# Patient Record
Sex: FEMALE | Race: WHITE | NOT HISPANIC OR LATINO | URBAN - METROPOLITAN AREA
[De-identification: names, ages, dates, MRNs, and addresses within clinical notes are randomized per-mention and may not be internally consistent; named-entity substitution may affect disease eponyms.]

---

## 2017-05-23 ENCOUNTER — APPOINTMENT (OUTPATIENT)
Age: 47
Setting detail: DERMATOLOGY
End: 2017-05-28

## 2017-05-23 DIAGNOSIS — L23.9 ALLERGIC CONTACT DERMATITIS, UNSPECIFIED CAUSE: ICD-10-CM

## 2017-05-23 DIAGNOSIS — D22 MELANOCYTIC NEVI: ICD-10-CM

## 2017-05-23 DIAGNOSIS — L82.1 OTHER SEBORRHEIC KERATOSIS: ICD-10-CM

## 2017-05-23 DIAGNOSIS — L90.5 SCAR CONDITIONS AND FIBROSIS OF SKIN: ICD-10-CM

## 2017-05-23 DIAGNOSIS — L73.8 OTHER SPECIFIED FOLLICULAR DISORDERS: ICD-10-CM

## 2017-05-23 PROBLEM — D22.62 MELANOCYTIC NEVI OF LEFT UPPER LIMB, INCLUDING SHOULDER: Status: ACTIVE | Noted: 2017-05-23

## 2017-05-23 PROBLEM — D22.5 MELANOCYTIC NEVI OF TRUNK: Status: ACTIVE | Noted: 2017-05-23

## 2017-05-23 PROBLEM — D22.61 MELANOCYTIC NEVI OF RIGHT UPPER LIMB, INCLUDING SHOULDER: Status: ACTIVE | Noted: 2017-05-23

## 2017-05-23 PROCEDURE — 99214 OFFICE O/P EST MOD 30 MIN: CPT

## 2017-05-23 PROCEDURE — OTHER COUNSELING: OTHER

## 2017-05-23 PROCEDURE — OTHER OBSERVATION: OTHER

## 2017-05-23 PROCEDURE — OTHER OBSERVATION AND MEASURE: OTHER

## 2017-05-23 ASSESSMENT — LOCATION ZONE DERM
LOCATION ZONE: LEG
LOCATION ZONE: ARM
LOCATION ZONE: FINGER
LOCATION ZONE: TRUNK
LOCATION ZONE: HAND

## 2017-05-23 ASSESSMENT — LOCATION DETAILED DESCRIPTION DERM
LOCATION DETAILED: RIGHT MEDIAL DISTAL PRETIBIAL REGION
LOCATION DETAILED: LEFT VENTRAL PROXIMAL FOREARM
LOCATION DETAILED: LEFT ULNAR PALM
LOCATION DETAILED: RIGHT MEDIAL BREAST 1-2:00 REGION
LOCATION DETAILED: RIGHT ANTECUBITAL SKIN
LOCATION DETAILED: LEFT DISTAL PRETIBIAL REGION
LOCATION DETAILED: LEFT PROXIMAL DORSAL RING FINGER
LOCATION DETAILED: RIGHT SUPERIOR MEDIAL MIDBACK
LOCATION DETAILED: PERIUMBILICAL SKIN

## 2017-05-23 ASSESSMENT — LOCATION SIMPLE DESCRIPTION DERM
LOCATION SIMPLE: RIGHT BREAST
LOCATION SIMPLE: LEFT PRETIBIAL REGION
LOCATION SIMPLE: RIGHT LOWER BACK
LOCATION SIMPLE: LEFT FOREARM
LOCATION SIMPLE: RIGHT PRETIBIAL REGION
LOCATION SIMPLE: LEFT RING FINGER
LOCATION SIMPLE: LEFT HAND
LOCATION SIMPLE: ABDOMEN
LOCATION SIMPLE: RIGHT UPPER ARM

## 2017-05-24 ENCOUNTER — RX ONLY (RX ONLY)
Age: 47
End: 2017-05-24

## 2017-05-24 RX ORDER — HYDROCORTISONE 2.5 %
OINTMENT (GRAM) TOPICAL
Qty: 1 | Refills: 0 | Status: ERX | COMMUNITY
Start: 2017-05-24

## 2017-08-23 ENCOUNTER — APPOINTMENT (OUTPATIENT)
Age: 47
Setting detail: DERMATOLOGY
End: 2017-08-26

## 2017-08-23 DIAGNOSIS — D22 MELANOCYTIC NEVI: ICD-10-CM

## 2017-08-23 DIAGNOSIS — D485 NEOPLASM OF UNCERTAIN BEHAVIOR OF SKIN: ICD-10-CM

## 2017-08-23 DIAGNOSIS — L81.0 POSTINFLAMMATORY HYPERPIGMENTATION: ICD-10-CM

## 2017-08-23 DIAGNOSIS — L23.9 ALLERGIC CONTACT DERMATITIS, UNSPECIFIED CAUSE: ICD-10-CM

## 2017-08-23 PROBLEM — D22.62 MELANOCYTIC NEVI OF LEFT UPPER LIMB, INCLUDING SHOULDER: Status: ACTIVE | Noted: 2017-08-23

## 2017-08-23 PROBLEM — D48.5 NEOPLASM OF UNCERTAIN BEHAVIOR OF SKIN: Status: ACTIVE | Noted: 2017-08-23

## 2017-08-23 PROBLEM — D22.71 MELANOCYTIC NEVI OF RIGHT LOWER LIMB, INCLUDING HIP: Status: ACTIVE | Noted: 2017-08-23

## 2017-08-23 PROCEDURE — OTHER OBSERVATION: OTHER

## 2017-08-23 PROCEDURE — OTHER COUNSELING: OTHER

## 2017-08-23 PROCEDURE — 11100: CPT

## 2017-08-23 PROCEDURE — 99213 OFFICE O/P EST LOW 20 MIN: CPT | Mod: 25

## 2017-08-23 PROCEDURE — OTHER OBSERVATION AND MEASURE: OTHER

## 2017-08-23 PROCEDURE — OTHER BIOPSY BY SHAVE METHOD: OTHER

## 2017-08-23 ASSESSMENT — LOCATION ZONE DERM
LOCATION ZONE: LEG
LOCATION ZONE: TRUNK
LOCATION ZONE: HAND

## 2017-08-23 ASSESSMENT — LOCATION SIMPLE DESCRIPTION DERM
LOCATION SIMPLE: LEFT HAND
LOCATION SIMPLE: GROIN
LOCATION SIMPLE: RIGHT POPLITEAL SKIN
LOCATION SIMPLE: LEFT LOWER BACK

## 2017-08-23 ASSESSMENT — LOCATION DETAILED DESCRIPTION DERM
LOCATION DETAILED: RIGHT SUPRAPUBIC SKIN
LOCATION DETAILED: RIGHT POPLITEAL SKIN
LOCATION DETAILED: LEFT SUPERIOR MEDIAL LOWER BACK
LOCATION DETAILED: LEFT ULNAR PALM

## 2017-08-23 NOTE — PROCEDURE: BIOPSY BY SHAVE METHOD
Biopsy Method: Double edge Personna blades
Additional Anesthesia Volume In Cc (Will Not Render If 0): 0
Electrodesiccation Text: The wound bed was treated with electrodesiccation after the biopsy was performed.
Billing Type: Third-Party Bill
Bill 22572 For Specimen Handling/Conveyance To Laboratory?: no
Consent: Written consent was obtained and risks were reviewed including but not limited to scarring, infection, bleeding, scabbing, incomplete removal, nerve damage and allergy to anesthesia.
Anesthesia Volume In Cc (Will Not Render If 0): 0.5
Hemostasis: Drysol
Wound Care: Vaseline
Type Of Destruction Used: Curettage
Render Post-Care Instructions In Note?: yes
Notification Instructions: Patient will be notified of biopsy results. However, patient instructed to call the office if not contacted within 2 weeks.
Biopsy Type: H and E
Detail Level: Detailed
Silver Nitrate Text: The wound bed was treated with silver nitrate after the biopsy was performed.
Electrodesiccation And Curettage Text: The wound bed was treated with electrodesiccation and curettage after the biopsy was performed.
Anesthesia Type: 2% lidocaine with epinephrine
Post-Care Instructions: I reviewed with the patient in detail post-care instructions. Patient is to keep the biopsy site dry overnight, and then apply bacitracin twice daily until healed. Patient may apply hydrogen peroxide soaks to remove any crusting.
Cryotherapy Text: The wound bed was treated with cryotherapy after the biopsy was performed.
Dressing: bandage

## 2017-09-28 ENCOUNTER — APPOINTMENT (OUTPATIENT)
Age: 47
Setting detail: DERMATOLOGY
End: 2017-10-04

## 2017-09-28 ENCOUNTER — APPOINTMENT (OUTPATIENT)
Age: 47
Setting detail: DERMATOLOGY
End: 2017-09-28

## 2017-09-28 DIAGNOSIS — Z48.817 ENCOUNTER FOR SURGICAL AFTERCARE FOLLOWING SURGERY ON THE SKIN AND SUBCUTANEOUS TISSUE: ICD-10-CM

## 2017-09-28 DIAGNOSIS — Z41.9 ENCOUNTER FOR PROCEDURE FOR PURPOSES OTHER THAN REMEDYING HEALTH STATE, UNSPECIFIED: ICD-10-CM

## 2017-09-28 PROCEDURE — 99212 OFFICE O/P EST SF 10 MIN: CPT

## 2017-09-28 PROCEDURE — OTHER COUNSELING: OTHER

## 2017-09-28 PROCEDURE — OTHER REASSURANCE: OTHER

## 2017-09-28 PROCEDURE — OTHER MEDICAL CONSULTATION: DERMAPEN: OTHER

## 2017-09-28 PROCEDURE — OTHER POST-OP WOUND EVALUATION: OTHER

## 2017-09-28 PROCEDURE — OTHER MEDICAL CONSULTATION: PRODUCTS: OTHER

## 2017-09-28 PROCEDURE — OTHER OTHER (COSMETIC): OTHER

## 2017-09-28 ASSESSMENT — LOCATION SIMPLE DESCRIPTION DERM
LOCATION SIMPLE: LEFT CHEEK
LOCATION SIMPLE: LEFT LOWER BACK
LOCATION SIMPLE: RIGHT CHEEK

## 2017-09-28 ASSESSMENT — LOCATION ZONE DERM
LOCATION ZONE: TRUNK
LOCATION ZONE: FACE

## 2017-09-28 ASSESSMENT — LOCATION DETAILED DESCRIPTION DERM
LOCATION DETAILED: RIGHT INFERIOR LATERAL MALAR CHEEK
LOCATION DETAILED: RIGHT LATERAL MALAR CHEEK
LOCATION DETAILED: LEFT SUPERIOR MEDIAL MIDBACK
LOCATION DETAILED: LEFT INFERIOR CENTRAL MALAR CHEEK
LOCATION DETAILED: LEFT SUPERIOR LATERAL MIDBACK

## 2017-09-28 NOTE — PROCEDURE: POST-OP WOUND EVALUATION
Add 49628 Cpt? (Important Note: In 2017 The Use Of 86901 Is Being Tracked By Cms To Determine Future Global Period Reimbursement For Global Periods): no
Follow Up Units (Optional): 0
Wound Evaluated By (Optional): Tammy PLUMMERC
Detail Level: Simple

## 2017-09-28 NOTE — PROCEDURE: OTHER (COSMETIC)
Detail Level: Zone
Other (Free Text): rec micro needling series for the face. also discussed the importance of good at home skin care, pt states she is \"anti-chemicals\" --- pt purchased tretinion .05 and ZO vit c today. rec 3 to 4 nights per week of 1/2 gram tret to face. LM

## 2018-10-30 ENCOUNTER — APPOINTMENT (OUTPATIENT)
Age: 48
Setting detail: DERMATOLOGY
End: 2018-10-31

## 2018-10-30 DIAGNOSIS — D485 NEOPLASM OF UNCERTAIN BEHAVIOR OF SKIN: ICD-10-CM

## 2018-10-30 DIAGNOSIS — L57.8 OTHER SKIN CHANGES DUE TO CHRONIC EXPOSURE TO NONIONIZING RADIATION: ICD-10-CM

## 2018-10-30 DIAGNOSIS — L81.1 CHLOASMA: ICD-10-CM

## 2018-10-30 DIAGNOSIS — Z71.89 OTHER SPECIFIED COUNSELING: ICD-10-CM

## 2018-10-30 DIAGNOSIS — D22 MELANOCYTIC NEVI: ICD-10-CM

## 2018-10-30 PROBLEM — D23.39 OTHER BENIGN NEOPLASM OF SKIN OF OTHER PARTS OF FACE: Status: ACTIVE | Noted: 2018-10-30

## 2018-10-30 PROBLEM — D22.62 MELANOCYTIC NEVI OF LEFT UPPER LIMB, INCLUDING SHOULDER: Status: ACTIVE | Noted: 2018-10-30

## 2018-10-30 PROBLEM — D48.5 NEOPLASM OF UNCERTAIN BEHAVIOR OF SKIN: Status: ACTIVE | Noted: 2018-10-30

## 2018-10-30 PROBLEM — D22.5 MELANOCYTIC NEVI OF TRUNK: Status: ACTIVE | Noted: 2018-10-30

## 2018-10-30 PROBLEM — D22.61 MELANOCYTIC NEVI OF RIGHT UPPER LIMB, INCLUDING SHOULDER: Status: ACTIVE | Noted: 2018-10-30

## 2018-10-30 PROCEDURE — OTHER BIOPSY BY SHAVE METHOD: OTHER

## 2018-10-30 PROCEDURE — OTHER COUNSELING: OTHER

## 2018-10-30 PROCEDURE — OTHER MIPS QUALITY: OTHER

## 2018-10-30 PROCEDURE — OTHER TREATMENT REGIMEN: OTHER

## 2018-10-30 PROCEDURE — 11100: CPT

## 2018-10-30 PROCEDURE — 11101: CPT

## 2018-10-30 PROCEDURE — 99214 OFFICE O/P EST MOD 30 MIN: CPT | Mod: 25

## 2018-10-30 ASSESSMENT — LOCATION ZONE DERM
LOCATION ZONE: NECK
LOCATION ZONE: LEG
LOCATION ZONE: TRUNK
LOCATION ZONE: LIP
LOCATION ZONE: ARM

## 2018-10-30 ASSESSMENT — LOCATION SIMPLE DESCRIPTION DERM
LOCATION SIMPLE: ABDOMEN
LOCATION SIMPLE: RIGHT UPPER BACK
LOCATION SIMPLE: LEFT POSTERIOR UPPER ARM
LOCATION SIMPLE: LEFT PRETIBIAL REGION
LOCATION SIMPLE: RIGHT FOREARM
LOCATION SIMPLE: CHEST
LOCATION SIMPLE: LEFT FOREARM
LOCATION SIMPLE: POSTERIOR NECK
LOCATION SIMPLE: RIGHT POSTERIOR THIGH
LOCATION SIMPLE: RIGHT POSTERIOR UPPER ARM
LOCATION SIMPLE: UPPER LIP

## 2018-10-30 ASSESSMENT — LOCATION DETAILED DESCRIPTION DERM
LOCATION DETAILED: LEFT DISTAL PRETIBIAL REGION
LOCATION DETAILED: RIGHT DISTAL POSTERIOR THIGH
LOCATION DETAILED: RIGHT MEDIAL UPPER BACK
LOCATION DETAILED: RIGHT DISTAL RADIAL DORSAL FOREARM
LOCATION DETAILED: PHILTRUM
LOCATION DETAILED: LEFT PROXIMAL POSTERIOR UPPER ARM
LOCATION DETAILED: UPPER STERNUM
LOCATION DETAILED: RIGHT MEDIAL TRAPEZIAL NECK
LOCATION DETAILED: LEFT RIB CAGE
LOCATION DETAILED: RIGHT DISTAL POSTERIOR UPPER ARM
LOCATION DETAILED: LEFT PROXIMAL DORSAL FOREARM

## 2018-10-30 NOTE — PROCEDURE: BIOPSY BY SHAVE METHOD
Electrodesiccation Text: The wound bed was treated with electrodesiccation after the biopsy was performed.
Anesthesia Type: 1% Xylocaine with [id number] epinephrine and sodium bicarbonate
Wound Care: Vaseline
Post-Care Instructions: I reviewed with the patient in detail post-care instructions. Patient is to keep the biopsy site dry overnight, and then apply bacitracin twice daily until healed. Patient may apply hydrogen peroxide soaks to remove any crusting.
Size Of Lesion In Cm: 0
Detail Level: Detailed
Anesthesia Volume In Cc (Will Not Render If 0): 0.5
Type Of Destruction Used: Curettage
Bill For Surgical Tray: no
Electrodesiccation And Curettage Text: The wound bed was treated with electrodesiccation and curettage after the biopsy was performed.
Dressing: bandage
Biopsy Type: H and E
Billing Type: Third-Party Bill
Hemostasis: Drysol
Consent: Written consent was obtained and risks were reviewed including but not limited to scarring, infection, bleeding, scabbing, incomplete removal, nerve damage and allergy to anesthesia.
Biopsy Method: Personna blade
Cryotherapy Text: The wound bed was treated with cryotherapy after the biopsy was performed.
Render Post-Care Instructions In Note?: yes
Notification Instructions: Patient will be notified of biopsy results. However, patient instructed to call the office if not contacted within 2 weeks.
Silver Nitrate Text: The wound bed was treated with silver nitrate after the biopsy was performed.
Depth Of Biopsy: dermis

## 2018-10-30 NOTE — PROCEDURE: MIPS QUALITY
Detail Level: Zone
Quality 110: Preventive Care And Screening: Influenza Immunization: Influenza Immunization Administered during Influenza season
Quality 226: Preventive Care And Screening: Tobacco Use: Screening And Cessation Intervention: Patient screened for tobacco and never smoked

## 2019-10-08 ENCOUNTER — APPOINTMENT (OUTPATIENT)
Age: 49
Setting detail: DERMATOLOGY
End: 2019-10-17

## 2019-10-08 DIAGNOSIS — D22 MELANOCYTIC NEVI: ICD-10-CM

## 2019-10-08 DIAGNOSIS — D18.0 HEMANGIOMA: ICD-10-CM

## 2019-10-08 DIAGNOSIS — Z71.89 OTHER SPECIFIED COUNSELING: ICD-10-CM

## 2019-10-08 DIAGNOSIS — L82.1 OTHER SEBORRHEIC KERATOSIS: ICD-10-CM

## 2019-10-08 DIAGNOSIS — S90.1 CONTUSION OF TOE WITHOUT DAMAGE TO NAIL: ICD-10-CM

## 2019-10-08 DIAGNOSIS — L81.1 CHLOASMA: ICD-10-CM

## 2019-10-08 DIAGNOSIS — L57.8 OTHER SKIN CHANGES DUE TO CHRONIC EXPOSURE TO NONIONIZING RADIATION: ICD-10-CM

## 2019-10-08 DIAGNOSIS — L81.4 OTHER MELANIN HYPERPIGMENTATION: ICD-10-CM

## 2019-10-08 PROBLEM — D23.39 OTHER BENIGN NEOPLASM OF SKIN OF OTHER PARTS OF FACE: Status: ACTIVE | Noted: 2019-10-08

## 2019-10-08 PROBLEM — D22.62 MELANOCYTIC NEVI OF LEFT UPPER LIMB, INCLUDING SHOULDER: Status: ACTIVE | Noted: 2019-10-08

## 2019-10-08 PROBLEM — D22.72 MELANOCYTIC NEVI OF LEFT LOWER LIMB, INCLUDING HIP: Status: ACTIVE | Noted: 2019-10-08

## 2019-10-08 PROBLEM — D22.5 MELANOCYTIC NEVI OF TRUNK: Status: ACTIVE | Noted: 2019-10-08

## 2019-10-08 PROBLEM — S90.112A CONTUSION OF LEFT GREAT TOE WITHOUT DAMAGE TO NAIL, INITIAL ENCOUNTER: Status: ACTIVE | Noted: 2019-10-08

## 2019-10-08 PROBLEM — D18.01 HEMANGIOMA OF SKIN AND SUBCUTANEOUS TISSUE: Status: ACTIVE | Noted: 2019-10-08

## 2019-10-08 PROBLEM — D22.71 MELANOCYTIC NEVI OF RIGHT LOWER LIMB, INCLUDING HIP: Status: ACTIVE | Noted: 2019-10-08

## 2019-10-08 PROBLEM — D22.61 MELANOCYTIC NEVI OF RIGHT UPPER LIMB, INCLUDING SHOULDER: Status: ACTIVE | Noted: 2019-10-08

## 2019-10-08 PROCEDURE — OTHER MIPS QUALITY: OTHER

## 2019-10-08 PROCEDURE — OTHER TREATMENT REGIMEN: OTHER

## 2019-10-08 PROCEDURE — 99214 OFFICE O/P EST MOD 30 MIN: CPT

## 2019-10-08 PROCEDURE — OTHER COUNSELING: OTHER

## 2019-10-08 ASSESSMENT — LOCATION DETAILED DESCRIPTION DERM
LOCATION DETAILED: LEFT INFERIOR CENTRAL MALAR CHEEK
LOCATION DETAILED: LEFT SUPERIOR VERMILION LIP
LOCATION DETAILED: RIGHT SUPERIOR MEDIAL UPPER BACK
LOCATION DETAILED: LEFT GREAT TOENAIL
LOCATION DETAILED: LEFT INFERIOR MEDIAL UPPER BACK
LOCATION DETAILED: RIGHT CLAVICULAR SKIN
LOCATION DETAILED: EPIGASTRIC SKIN
LOCATION DETAILED: PERIUMBILICAL SKIN
LOCATION DETAILED: LEFT INFERIOR LATERAL NECK
LOCATION DETAILED: LEFT PROXIMAL PRETIBIAL REGION
LOCATION DETAILED: RIGHT PROXIMAL PRETIBIAL REGION
LOCATION DETAILED: LEFT DISTAL PRETIBIAL REGION
LOCATION DETAILED: RIGHT PROXIMAL RADIAL DORSAL FOREARM
LOCATION DETAILED: RIGHT PROXIMAL DORSAL FOREARM
LOCATION DETAILED: LEFT ANTERIOR DISTAL THIGH
LOCATION DETAILED: RIGHT ANTERIOR PROXIMAL THIGH
LOCATION DETAILED: RIGHT SUPERIOR MEDIAL MIDBACK
LOCATION DETAILED: RIGHT ANTERIOR DISTAL THIGH
LOCATION DETAILED: RIGHT DISTAL PRETIBIAL REGION
LOCATION DETAILED: LEFT DISTAL DORSAL FOREARM
LOCATION DETAILED: LEFT CENTRAL MALAR CHEEK
LOCATION DETAILED: RIGHT INFERIOR CENTRAL MALAR CHEEK
LOCATION DETAILED: LEFT PROXIMAL DORSAL FOREARM
LOCATION DETAILED: LEFT SUPERIOR UPPER BACK

## 2019-10-08 ASSESSMENT — LOCATION SIMPLE DESCRIPTION DERM
LOCATION SIMPLE: LEFT ANTERIOR NECK
LOCATION SIMPLE: LEFT GREAT TOE
LOCATION SIMPLE: LEFT CHEEK
LOCATION SIMPLE: RIGHT CLAVICULAR SKIN
LOCATION SIMPLE: LEFT THIGH
LOCATION SIMPLE: RIGHT LOWER BACK
LOCATION SIMPLE: ABDOMEN
LOCATION SIMPLE: LEFT PRETIBIAL REGION
LOCATION SIMPLE: RIGHT CHEEK
LOCATION SIMPLE: LEFT LIP
LOCATION SIMPLE: RIGHT FOREARM
LOCATION SIMPLE: RIGHT PRETIBIAL REGION
LOCATION SIMPLE: RIGHT THIGH
LOCATION SIMPLE: LEFT UPPER BACK
LOCATION SIMPLE: RIGHT UPPER BACK
LOCATION SIMPLE: LEFT FOREARM

## 2019-10-08 ASSESSMENT — LOCATION ZONE DERM
LOCATION ZONE: LIP
LOCATION ZONE: FACE
LOCATION ZONE: ARM
LOCATION ZONE: LEG
LOCATION ZONE: TOENAIL
LOCATION ZONE: NECK
LOCATION ZONE: TRUNK

## 2019-10-08 NOTE — PROCEDURE: TREATMENT REGIMEN
Plan: Pt is going to schedule an appt with the aesthetic girls at the office to discuss cosmetic options.
Detail Level: Zone
Plan: Follow up in 6 months once toenail grew out. Come without toe nail polish on. Will recheck to see how toe is healing after trauma.

## 2020-12-10 ENCOUNTER — APPOINTMENT (OUTPATIENT)
Age: 50
Setting detail: DERMATOLOGY
End: 2020-12-28

## 2020-12-10 DIAGNOSIS — D485 NEOPLASM OF UNCERTAIN BEHAVIOR OF SKIN: ICD-10-CM

## 2020-12-10 DIAGNOSIS — D22 MELANOCYTIC NEVI: ICD-10-CM

## 2020-12-10 DIAGNOSIS — L259 CONTACT DERMATITIS AND OTHER ECZEMA, UNSPECIFIED CAUSE: ICD-10-CM

## 2020-12-10 DIAGNOSIS — L57.8 OTHER SKIN CHANGES DUE TO CHRONIC EXPOSURE TO NONIONIZING RADIATION: ICD-10-CM

## 2020-12-10 DIAGNOSIS — L81.4 OTHER MELANIN HYPERPIGMENTATION: ICD-10-CM

## 2020-12-10 DIAGNOSIS — L82.1 OTHER SEBORRHEIC KERATOSIS: ICD-10-CM

## 2020-12-10 DIAGNOSIS — Z71.89 OTHER SPECIFIED COUNSELING: ICD-10-CM

## 2020-12-10 PROBLEM — D22.71 MELANOCYTIC NEVI OF RIGHT LOWER LIMB, INCLUDING HIP: Status: ACTIVE | Noted: 2020-12-10

## 2020-12-10 PROBLEM — L23.9 ALLERGIC CONTACT DERMATITIS, UNSPECIFIED CAUSE: Status: ACTIVE | Noted: 2020-12-10

## 2020-12-10 PROBLEM — D48.5 NEOPLASM OF UNCERTAIN BEHAVIOR OF SKIN: Status: ACTIVE | Noted: 2020-12-10

## 2020-12-10 PROBLEM — D22.61 MELANOCYTIC NEVI OF RIGHT UPPER LIMB, INCLUDING SHOULDER: Status: ACTIVE | Noted: 2020-12-10

## 2020-12-10 PROBLEM — D22.72 MELANOCYTIC NEVI OF LEFT LOWER LIMB, INCLUDING HIP: Status: ACTIVE | Noted: 2020-12-10

## 2020-12-10 PROBLEM — D22.5 MELANOCYTIC NEVI OF TRUNK: Status: ACTIVE | Noted: 2020-12-10

## 2020-12-10 PROBLEM — D23.39 OTHER BENIGN NEOPLASM OF SKIN OF OTHER PARTS OF FACE: Status: ACTIVE | Noted: 2020-12-10

## 2020-12-10 PROBLEM — D22.62 MELANOCYTIC NEVI OF LEFT UPPER LIMB, INCLUDING SHOULDER: Status: ACTIVE | Noted: 2020-12-10

## 2020-12-10 PROCEDURE — OTHER DEFER: OTHER

## 2020-12-10 PROCEDURE — OTHER COUNSELING: OTHER

## 2020-12-10 PROCEDURE — 11102 TANGNTL BX SKIN SINGLE LES: CPT

## 2020-12-10 PROCEDURE — OTHER PRESCRIPTION: OTHER

## 2020-12-10 PROCEDURE — OTHER BIOPSY BY SHAVE METHOD: OTHER

## 2020-12-10 PROCEDURE — OTHER OBSERVATION AND MEASURE: OTHER

## 2020-12-10 PROCEDURE — 99214 OFFICE O/P EST MOD 30 MIN: CPT | Mod: 25

## 2020-12-10 PROCEDURE — OTHER OBSERVATION: OTHER

## 2020-12-10 RX ORDER — TRIAMCINOLONE ACETONIDE 1 MG/G
OINTMENT TOPICAL
Qty: 1 | Refills: 1 | Status: ERX | COMMUNITY
Start: 2020-12-09

## 2020-12-10 ASSESSMENT — LOCATION SIMPLE DESCRIPTION DERM
LOCATION SIMPLE: RIGHT THIGH
LOCATION SIMPLE: RIGHT UPPER BACK
LOCATION SIMPLE: LEFT FOREARM
LOCATION SIMPLE: RIGHT CLAVICULAR SKIN
LOCATION SIMPLE: CHEST
LOCATION SIMPLE: LEFT CHEEK
LOCATION SIMPLE: INFERIOR FOREHEAD
LOCATION SIMPLE: RIGHT PRETIBIAL REGION
LOCATION SIMPLE: LEFT PRETIBIAL REGION
LOCATION SIMPLE: LEFT UPPER BACK
LOCATION SIMPLE: RIGHT FOREARM
LOCATION SIMPLE: ABDOMEN
LOCATION SIMPLE: RIGHT CHEEK
LOCATION SIMPLE: LEFT THIGH

## 2020-12-10 ASSESSMENT — LOCATION DETAILED DESCRIPTION DERM
LOCATION DETAILED: RIGHT ANTERIOR DISTAL THIGH
LOCATION DETAILED: LEFT SUPERIOR UPPER BACK
LOCATION DETAILED: RIGHT DISTAL PRETIBIAL REGION
LOCATION DETAILED: INFERIOR MID FOREHEAD
LOCATION DETAILED: RIGHT PROXIMAL PRETIBIAL REGION
LOCATION DETAILED: PERIUMBILICAL SKIN
LOCATION DETAILED: LEFT INFERIOR CENTRAL MALAR CHEEK
LOCATION DETAILED: MIDDLE STERNUM
LOCATION DETAILED: LEFT DISTAL DORSAL FOREARM
LOCATION DETAILED: RIGHT INFERIOR CENTRAL MALAR CHEEK
LOCATION DETAILED: EPIGASTRIC SKIN
LOCATION DETAILED: RIGHT PROXIMAL RADIAL DORSAL FOREARM
LOCATION DETAILED: RIGHT CENTRAL MALAR CHEEK
LOCATION DETAILED: LEFT CENTRAL MALAR CHEEK
LOCATION DETAILED: RIGHT CLAVICULAR SKIN
LOCATION DETAILED: RIGHT PROXIMAL DORSAL FOREARM
LOCATION DETAILED: LEFT DISTAL PRETIBIAL REGION
LOCATION DETAILED: RIGHT ANTERIOR PROXIMAL THIGH
LOCATION DETAILED: LEFT PROXIMAL PRETIBIAL REGION
LOCATION DETAILED: RIGHT SUPERIOR MEDIAL UPPER BACK
LOCATION DETAILED: LEFT PROXIMAL DORSAL FOREARM
LOCATION DETAILED: LEFT ANTERIOR DISTAL THIGH

## 2020-12-10 ASSESSMENT — LOCATION ZONE DERM
LOCATION ZONE: TRUNK
LOCATION ZONE: FACE
LOCATION ZONE: ARM
LOCATION ZONE: LEG

## 2020-12-10 NOTE — PROCEDURE: BIOPSY BY SHAVE METHOD
Anesthesia Volume In Cc (Will Not Render If 0): 0.5
Validate Lesion Size: No
Anesthesia Type: 1% lidocaine with epinephrine and a 1:10 solution of 8.4% sodium bicarbonate
Hemostasis: Drysol
Biopsy Type: H and E
Information: Selecting Yes will display possible errors in your note based on the variables you have selected. This validation is only offered as a suggestion for you. PLEASE NOTE THAT THE VALIDATION TEXT WILL BE REMOVED WHEN YOU FINALIZE YOUR NOTE. IF YOU WANT TO FAX A PRELIMINARY NOTE YOU WILL NEED TO TOGGLE THIS TO 'NO' IF YOU DO NOT WANT IT IN YOUR FAXED NOTE.
Type Of Destruction Used: Curettage
Billing Type: Third-Party Bill
Consent: Written consent was obtained and risks were reviewed including but not limited to scarring, infection, bleeding, scabbing, incomplete removal, nerve damage and allergy to anesthesia.
Notification Instructions: Patient will be notified of biopsy results. However, patient instructed to call the office if not contacted within 2 weeks.
Electrodesiccation And Curettage Text: The wound bed was treated with electrodesiccation and curettage after the biopsy was performed.
Cryotherapy Text: The wound bed was treated with cryotherapy after the biopsy was performed.
Silver Nitrate Text: The wound bed was treated with silver nitrate after the biopsy was performed.
Post-Care Instructions: I reviewed with the patient in detail post-care instructions. Patient is to keep the biopsy site dry overnight, and then apply bacitracin twice daily until healed. Patient may apply hydrogen peroxide soaks to remove any crusting.
Electrodesiccation Text: The wound bed was treated with electrodesiccation after the biopsy was performed.
Dressing: bandage
X Size Of Lesion In Cm: 0
Wound Care: Vaseline
Was A Bandage Applied: Yes
Biopsy Method: Personna blade
Path Notes (To The Dermatopathologist): ,
Detail Level: Detailed
Depth Of Biopsy: dermis

## 2020-12-21 ENCOUNTER — APPOINTMENT (OUTPATIENT)
Age: 50
Setting detail: DERMATOLOGY
End: 2020-12-30

## 2020-12-21 DIAGNOSIS — D485 NEOPLASM OF UNCERTAIN BEHAVIOR OF SKIN: ICD-10-CM

## 2020-12-21 DIAGNOSIS — L01.01 NON-BULLOUS IMPETIGO: ICD-10-CM

## 2020-12-21 PROBLEM — D48.5 NEOPLASM OF UNCERTAIN BEHAVIOR OF SKIN: Status: ACTIVE | Noted: 2020-12-21

## 2020-12-21 PROCEDURE — OTHER ORDER TESTS: OTHER

## 2020-12-21 PROCEDURE — 99212 OFFICE O/P EST SF 10 MIN: CPT | Mod: 25

## 2020-12-21 PROCEDURE — OTHER PRESCRIPTION: OTHER

## 2020-12-21 PROCEDURE — 11102 TANGNTL BX SKIN SINGLE LES: CPT

## 2020-12-21 PROCEDURE — OTHER COUNSELING: OTHER

## 2020-12-21 PROCEDURE — OTHER BIOPSY BY SHAVE METHOD: OTHER

## 2020-12-21 RX ORDER — MUPIROCIN 20 MG/G
OINTMENT TOPICAL
Qty: 1 | Refills: 0 | Status: ERX | COMMUNITY
Start: 2020-12-21

## 2020-12-21 ASSESSMENT — LOCATION ZONE DERM
LOCATION ZONE: TRUNK
LOCATION ZONE: LEG

## 2020-12-21 ASSESSMENT — LOCATION DETAILED DESCRIPTION DERM
LOCATION DETAILED: RIGHT ANTERIOR PROXIMAL THIGH
LOCATION DETAILED: LEFT BUTTOCK

## 2020-12-21 ASSESSMENT — LOCATION SIMPLE DESCRIPTION DERM
LOCATION SIMPLE: RIGHT THIGH
LOCATION SIMPLE: LEFT BUTTOCK

## 2020-12-21 NOTE — PROCEDURE: BIOPSY BY SHAVE METHOD
Consent: Written consent was obtained and risks were reviewed including but not limited to scarring, infection, bleeding, scabbing, incomplete removal, nerve damage and allergy to anesthesia.
Electrodesiccation Text: The wound bed was treated with electrodesiccation after the biopsy was performed.
Hide Accession Number?: No
Detail Level: Detailed
Wound Care: Vaseline
Anesthesia Volume In Cc (Will Not Render If 0): 0.5
Biopsy Type: H and E
Type Of Destruction Used: Curettage
Silver Nitrate Text: The wound bed was treated with silver nitrate after the biopsy was performed.
Post-Care Instructions: I reviewed with the patient in detail post-care instructions. Patient is to keep the biopsy site dry overnight, and then apply bacitracin twice daily until healed. Patient may apply hydrogen peroxide soaks to remove any crusting.
Render Post-Care Instructions In Note?: yes
Hemostasis: Drysol
Electrodesiccation And Curettage Text: The wound bed was treated with electrodesiccation and curettage after the biopsy was performed.
Biopsy Method: Personna blade
Billing Type: Third-Party Bill
Information: Selecting Yes will display possible errors in your note based on the variables you have selected. This validation is only offered as a suggestion for you. PLEASE NOTE THAT THE VALIDATION TEXT WILL BE REMOVED WHEN YOU FINALIZE YOUR NOTE. IF YOU WANT TO FAX A PRELIMINARY NOTE YOU WILL NEED TO TOGGLE THIS TO 'NO' IF YOU DO NOT WANT IT IN YOUR FAXED NOTE.
X Size Of Lesion In Cm: 0
Depth Of Biopsy: dermis
Cryotherapy Text: The wound bed was treated with cryotherapy after the biopsy was performed.
Dressing: bandage
Anesthesia Type: 1% lidocaine with epinephrine and a 1:10 solution of 8.4% sodium bicarbonate
Notification Instructions: Patient will be notified of biopsy results. However, patient instructed to call the office if not contacted within 2 weeks.

## 2022-02-09 ENCOUNTER — APPOINTMENT (OUTPATIENT)
Age: 52
Setting detail: DERMATOLOGY
End: 2022-02-12

## 2022-02-09 DIAGNOSIS — L82.1 OTHER SEBORRHEIC KERATOSIS: ICD-10-CM

## 2022-02-09 DIAGNOSIS — L85.3 XEROSIS CUTIS: ICD-10-CM

## 2022-02-09 DIAGNOSIS — D485 NEOPLASM OF UNCERTAIN BEHAVIOR OF SKIN: ICD-10-CM

## 2022-02-09 DIAGNOSIS — L81.4 OTHER MELANIN HYPERPIGMENTATION: ICD-10-CM

## 2022-02-09 DIAGNOSIS — D22 MELANOCYTIC NEVI: ICD-10-CM

## 2022-02-09 DIAGNOSIS — L57.8 OTHER SKIN CHANGES DUE TO CHRONIC EXPOSURE TO NONIONIZING RADIATION: ICD-10-CM

## 2022-02-09 DIAGNOSIS — Z71.89 OTHER SPECIFIED COUNSELING: ICD-10-CM

## 2022-02-09 PROBLEM — D22.61 MELANOCYTIC NEVI OF RIGHT UPPER LIMB, INCLUDING SHOULDER: Status: ACTIVE | Noted: 2022-02-09

## 2022-02-09 PROBLEM — D22.5 MELANOCYTIC NEVI OF TRUNK: Status: ACTIVE | Noted: 2022-02-09

## 2022-02-09 PROBLEM — D48.5 NEOPLASM OF UNCERTAIN BEHAVIOR OF SKIN: Status: ACTIVE | Noted: 2022-02-09

## 2022-02-09 PROBLEM — D23.39 OTHER BENIGN NEOPLASM OF SKIN OF OTHER PARTS OF FACE: Status: ACTIVE | Noted: 2022-02-09

## 2022-02-09 PROBLEM — D22.62 MELANOCYTIC NEVI OF LEFT UPPER LIMB, INCLUDING SHOULDER: Status: ACTIVE | Noted: 2022-02-09

## 2022-02-09 PROCEDURE — OTHER BIOPSY BY SHAVE METHOD: OTHER

## 2022-02-09 PROCEDURE — 99213 OFFICE O/P EST LOW 20 MIN: CPT | Mod: 25

## 2022-02-09 PROCEDURE — 11102 TANGNTL BX SKIN SINGLE LES: CPT

## 2022-02-09 PROCEDURE — OTHER COUNSELING: OTHER

## 2022-02-09 ASSESSMENT — LOCATION DETAILED DESCRIPTION DERM
LOCATION DETAILED: SUPERIOR THORACIC SPINE
LOCATION DETAILED: RIGHT RADIAL DORSAL HAND
LOCATION DETAILED: RIGHT ANTERIOR PROXIMAL UPPER ARM
LOCATION DETAILED: RIGHT DISTAL PRETIBIAL REGION
LOCATION DETAILED: LEFT ANTERIOR PROXIMAL UPPER ARM
LOCATION DETAILED: LEFT SUPERIOR MEDIAL UPPER BACK
LOCATION DETAILED: INFERIOR THORACIC SPINE
LOCATION DETAILED: INFERIOR MID FOREHEAD
LOCATION DETAILED: LEFT DISTAL PRETIBIAL REGION
LOCATION DETAILED: RIGHT PROXIMAL DORSAL FOREARM
LOCATION DETAILED: LEFT ULNAR DORSAL HAND
LOCATION DETAILED: PERIUMBILICAL SKIN
LOCATION DETAILED: LEFT PROXIMAL PRETIBIAL REGION
LOCATION DETAILED: LEFT INFERIOR MEDIAL FOREHEAD
LOCATION DETAILED: LEFT PROXIMAL DORSAL FOREARM
LOCATION DETAILED: LEFT ANTERIOR DISTAL THIGH

## 2022-02-09 ASSESSMENT — LOCATION SIMPLE DESCRIPTION DERM
LOCATION SIMPLE: RIGHT UPPER ARM
LOCATION SIMPLE: LEFT THIGH
LOCATION SIMPLE: LEFT FOREARM
LOCATION SIMPLE: LEFT PRETIBIAL REGION
LOCATION SIMPLE: INFERIOR FOREHEAD
LOCATION SIMPLE: RIGHT PRETIBIAL REGION
LOCATION SIMPLE: RIGHT HAND
LOCATION SIMPLE: LEFT UPPER ARM
LOCATION SIMPLE: UPPER BACK
LOCATION SIMPLE: RIGHT FOREARM
LOCATION SIMPLE: LEFT HAND
LOCATION SIMPLE: ABDOMEN
LOCATION SIMPLE: LEFT FOREHEAD
LOCATION SIMPLE: LEFT UPPER BACK

## 2022-02-09 ASSESSMENT — LOCATION ZONE DERM
LOCATION ZONE: LEG
LOCATION ZONE: FACE
LOCATION ZONE: TRUNK
LOCATION ZONE: ARM
LOCATION ZONE: HAND

## 2022-02-09 NOTE — PROCEDURE: BIOPSY BY SHAVE METHOD
Post-Care Instructions: I reviewed with the patient in detail post-care instructions. Patient is to keep the biopsy site dry overnight, and then apply bacitracin twice daily until healed. Patient may apply hydrogen peroxide soaks to remove any crusting.
Render Path Notes In Note?: No
Path Notes (To The Dermatopathologist): ,
X Size Of Lesion In Cm: 0
Hemostasis: Drysol
Notification Instructions: Patient will be notified of biopsy results. However, patient instructed to call the office if not contacted within 2 weeks.
Silver Nitrate Text: The wound bed was treated with silver nitrate after the biopsy was performed.
Was A Bandage Applied: Yes
Billing Type: Third-Party Bill
Depth Of Biopsy: dermis
Information: Selecting Yes will display possible errors in your note based on the variables you have selected. This validation is only offered as a suggestion for you. PLEASE NOTE THAT THE VALIDATION TEXT WILL BE REMOVED WHEN YOU FINALIZE YOUR NOTE. IF YOU WANT TO FAX A PRELIMINARY NOTE YOU WILL NEED TO TOGGLE THIS TO 'NO' IF YOU DO NOT WANT IT IN YOUR FAXED NOTE.
Cryotherapy Text: The wound bed was treated with cryotherapy after the biopsy was performed.
Biopsy Method: Personna blade
Detail Level: Detailed
Wound Care: Vaseline
Consent: Written consent was obtained and risks were reviewed including but not limited to scarring, infection, bleeding, scabbing, incomplete removal, nerve damage and allergy to anesthesia.
Electrodesiccation And Curettage Text: The wound bed was treated with electrodesiccation and curettage after the biopsy was performed.
Dressing: bandage
Type Of Destruction Used: Curettage
Anesthesia Volume In Cc (Will Not Render If 0): 0.5
Biopsy Type: H and E
Electrodesiccation Text: The wound bed was treated with electrodesiccation after the biopsy was performed.
Anesthesia Type: 1% lidocaine with epinephrine and a 1:10 solution of 8.4% sodium bicarbonate

## 2022-02-22 ENCOUNTER — APPOINTMENT (OUTPATIENT)
Age: 52
Setting detail: DERMATOLOGY
End: 2022-02-22

## 2023-01-23 ENCOUNTER — APPOINTMENT (RX ONLY)
Dept: URBAN - METROPOLITAN AREA CLINIC 166 | Facility: CLINIC | Age: 53
Setting detail: DERMATOLOGY
End: 2023-01-23

## 2023-01-23 DIAGNOSIS — D22 MELANOCYTIC NEVI: ICD-10-CM

## 2023-01-23 DIAGNOSIS — L81.4 OTHER MELANIN HYPERPIGMENTATION: ICD-10-CM

## 2023-01-23 DIAGNOSIS — L65.9 NONSCARRING HAIR LOSS, UNSPECIFIED: ICD-10-CM

## 2023-01-23 DIAGNOSIS — L82.1 OTHER SEBORRHEIC KERATOSIS: ICD-10-CM

## 2023-01-23 DIAGNOSIS — D18.0 HEMANGIOMA: ICD-10-CM

## 2023-01-23 DIAGNOSIS — L57.8 OTHER SKIN CHANGES DUE TO CHRONIC EXPOSURE TO NONIONIZING RADIATION: ICD-10-CM

## 2023-01-23 PROBLEM — D18.01 HEMANGIOMA OF SKIN AND SUBCUTANEOUS TISSUE: Status: ACTIVE | Noted: 2023-01-23

## 2023-01-23 PROBLEM — D22.5 MELANOCYTIC NEVI OF TRUNK: Status: ACTIVE | Noted: 2023-01-23

## 2023-01-23 PROCEDURE — 99214 OFFICE O/P EST MOD 30 MIN: CPT

## 2023-01-23 PROCEDURE — ? ORDER TESTS

## 2023-01-23 PROCEDURE — ? COUNSELING

## 2023-01-23 PROCEDURE — ? TREATMENT REGIMEN

## 2023-01-23 ASSESSMENT — LOCATION DETAILED DESCRIPTION DERM
LOCATION DETAILED: RIGHT SUPERIOR MEDIAL UPPER BACK
LOCATION DETAILED: RIGHT PROXIMAL POSTERIOR UPPER ARM
LOCATION DETAILED: RIGHT SUPERIOR MEDIAL MIDBACK
LOCATION DETAILED: SUPERIOR THORACIC SPINE
LOCATION DETAILED: RIGHT MEDIAL UPPER BACK
LOCATION DETAILED: LEFT PROXIMAL POSTERIOR UPPER ARM

## 2023-01-23 ASSESSMENT — LOCATION SIMPLE DESCRIPTION DERM
LOCATION SIMPLE: RIGHT LOWER BACK
LOCATION SIMPLE: LEFT POSTERIOR UPPER ARM
LOCATION SIMPLE: RIGHT UPPER BACK
LOCATION SIMPLE: UPPER BACK
LOCATION SIMPLE: RIGHT POSTERIOR UPPER ARM

## 2023-01-23 ASSESSMENT — LOCATION ZONE DERM
LOCATION ZONE: TRUNK
LOCATION ZONE: ARM

## 2023-01-23 NOTE — PROCEDURE: ORDER TESTS
Expected Date Of Service: 01/23/2023
Billing Type: Third-Party Bill
Bill For Surgical Tray: no
Performing Laboratory: [id number]

## 2024-08-21 ENCOUNTER — APPOINTMENT (RX ONLY)
Dept: URBAN - METROPOLITAN AREA CLINIC 166 | Facility: CLINIC | Age: 54
Setting detail: DERMATOLOGY
End: 2024-08-21

## 2024-08-21 DIAGNOSIS — H61.03 CHONDRITIS OF EXTERNAL EAR: ICD-10-CM

## 2024-08-21 DIAGNOSIS — L82.1 OTHER SEBORRHEIC KERATOSIS: ICD-10-CM

## 2024-08-21 DIAGNOSIS — Z80.8 FAMILY HISTORY OF MALIGNANT NEOPLASM OF OTHER ORGANS OR SYSTEMS: ICD-10-CM

## 2024-08-21 DIAGNOSIS — D22 MELANOCYTIC NEVI: ICD-10-CM

## 2024-08-21 DIAGNOSIS — D18.0 HEMANGIOMA: ICD-10-CM

## 2024-08-21 DIAGNOSIS — L81.4 OTHER MELANIN HYPERPIGMENTATION: ICD-10-CM

## 2024-08-21 DIAGNOSIS — Z71.89 OTHER SPECIFIED COUNSELING: ICD-10-CM

## 2024-08-21 PROBLEM — D18.01 HEMANGIOMA OF SKIN AND SUBCUTANEOUS TISSUE: Status: ACTIVE | Noted: 2024-08-21

## 2024-08-21 PROBLEM — H61.031 CHONDRITIS OF RIGHT EXTERNAL EAR: Status: ACTIVE | Noted: 2024-08-21

## 2024-08-21 PROBLEM — D22.5 MELANOCYTIC NEVI OF TRUNK: Status: ACTIVE | Noted: 2024-08-21

## 2024-08-21 PROCEDURE — 99213 OFFICE O/P EST LOW 20 MIN: CPT

## 2024-08-21 PROCEDURE — ? COUNSELING

## 2024-08-21 PROCEDURE — ? DIAGNOSIS COMMENT

## 2024-08-21 ASSESSMENT — LOCATION DETAILED DESCRIPTION DERM
LOCATION DETAILED: RIGHT ANTERIOR SHOULDER
LOCATION DETAILED: RIGHT ANTITRAGUS
LOCATION DETAILED: RIGHT LATERAL SUPERIOR CHEST
LOCATION DETAILED: LEFT PROXIMAL LATERAL POSTERIOR UPPER ARM
LOCATION DETAILED: RIGHT SUPERIOR CENTRAL MALAR CHEEK
LOCATION DETAILED: RIGHT SUPERIOR UPPER BACK
LOCATION DETAILED: LEFT SUPERIOR LATERAL LOWER BACK

## 2024-08-21 ASSESSMENT — LOCATION SIMPLE DESCRIPTION DERM
LOCATION SIMPLE: CHEST
LOCATION SIMPLE: RIGHT CHEEK
LOCATION SIMPLE: LEFT LOWER BACK
LOCATION SIMPLE: RIGHT UPPER BACK
LOCATION SIMPLE: RIGHT SHOULDER
LOCATION SIMPLE: LEFT POSTERIOR UPPER ARM
LOCATION SIMPLE: RIGHT EAR

## 2024-08-21 ASSESSMENT — LOCATION ZONE DERM
LOCATION ZONE: ARM
LOCATION ZONE: TRUNK
LOCATION ZONE: EAR
LOCATION ZONE: FACE

## 2024-08-21 NOTE — PROCEDURE: DIAGNOSIS COMMENT
Comment: More prominent cartilage on right ear. Patient sleeps on this side. No bothersome.
Render Risk Assessment In Note?: no
Detail Level: Simple